# Patient Record
Sex: MALE | Race: BLACK OR AFRICAN AMERICAN | Employment: FULL TIME | URBAN - METROPOLITAN AREA
[De-identification: names, ages, dates, MRNs, and addresses within clinical notes are randomized per-mention and may not be internally consistent; named-entity substitution may affect disease eponyms.]

---

## 2024-11-18 ENCOUNTER — OFFICE VISIT (OUTPATIENT)
Age: 19
End: 2024-11-18

## 2024-11-18 VITALS
RESPIRATION RATE: 18 BRPM | BODY MASS INDEX: 21.21 KG/M2 | HEART RATE: 115 BPM | SYSTOLIC BLOOD PRESSURE: 130 MMHG | OXYGEN SATURATION: 96 % | DIASTOLIC BLOOD PRESSURE: 64 MMHG | WEIGHT: 143.2 LBS | TEMPERATURE: 98.2 F | HEIGHT: 69 IN

## 2024-11-18 DIAGNOSIS — Z11.3 SCREENING EXAMINATION FOR STI: Primary | ICD-10-CM

## 2024-11-18 PROBLEM — Z28.82 PARENT REFUSES IMMUNIZATIONS: Status: ACTIVE | Noted: 2018-11-02

## 2024-11-18 PROBLEM — H90.41 SENSORINEURAL HEARING LOSS (SNHL) OF RIGHT EAR WITH UNRESTRICTED HEARING OF LEFT EAR: Status: ACTIVE | Noted: 2017-09-01

## 2024-11-18 RX ORDER — ALBUTEROL SULFATE 90 UG/1
2 INHALANT RESPIRATORY (INHALATION) EVERY 6 HOURS PRN
COMMUNITY
Start: 2024-06-30

## 2024-11-18 ASSESSMENT — ENCOUNTER SYMPTOMS
VOMITING: 0
DIARRHEA: 0
ABDOMINAL PAIN: 0
COUGH: 0
BACK PAIN: 0
NAUSEA: 0

## 2024-11-18 NOTE — PATIENT INSTRUCTIONS
Abstain from intercourse until labs have resulted.  Encourage rest and increase fluid intake.  Follow-up with your PCP as needed.  Return for severe/worsening symptoms.

## 2024-11-18 NOTE — PROGRESS NOTES
Thai Vincent (:  2005) is a 19 y.o. male,New patient, here for evaluation of the following chief complaint(s):  STD check (No exposure per pt )      Assessment & Plan :   Diagnosis Orders   1. Screening examination for STI  C.trachomatis N.gonorrhoeae DNA, Urine ([facility] Only)    Trichomonas by EIA           Differential diagnoses: UTI, pyelonephritis, chlamydia, gonorrhea, trichomonas   Plan: STI testing obtained for chlamydia, gonorrhea, and trichomonas. Discussed blood work but patient declined stating he would just like to do the urine testing today. He was advised to abstain from intercourse until labs have resulted. Encouraged to rest and increase fluid intake and follow-up with his PCP as needed. Return precautions discussed.   Follow up in 3 days if symptoms persist or if symptoms worsen.       Subjective :  HPI  Thai Vincent is a 19 y.o. male who presents for STI testing. He reports that he is here just for STI testing and denies any symptoms at this time. He denies any penile drainage, burning with urination, back pain, fevers, abdominal pain, vomiting, or diarrhea. He denies noting any penile sores. He denies any known exposures.        Vitals:    24 1539 24 1556   BP: (!) 142/71 130/64   Site: Right Upper Arm    Position: Sitting    Cuff Size: Small Adult    Pulse: (!) 115    Resp: 18    Temp: 98.2 °F (36.8 °C)    TempSrc: Oral    SpO2: 96%    Weight: 65 kg (143 lb 3.2 oz)    Height: 1.753 m (5' 9\")        No results found for this visit on 24.      Review of Systems   Constitutional:  Negative for chills, fatigue and fever.   Respiratory:  Negative for cough.    Gastrointestinal:  Negative for abdominal pain, diarrhea, nausea and vomiting.   Genitourinary:  Negative for dysuria, flank pain, frequency, genital sores, hematuria and urgency.   Musculoskeletal:  Negative for back pain.   Neurological:  Negative for syncope and headaches.       Objective

## 2024-11-19 LAB
C TRACH DNA UR QL NAA+PROBE: NEGATIVE
N GONORRHOEA DNA UR QL NAA+PROBE: NEGATIVE
SPECIMEN TYPE: NORMAL
TRICHOMONAS VAGINALIS SCREEN: NEGATIVE